# Patient Record
Sex: FEMALE | Race: WHITE | NOT HISPANIC OR LATINO | URBAN - METROPOLITAN AREA
[De-identification: names, ages, dates, MRNs, and addresses within clinical notes are randomized per-mention and may not be internally consistent; named-entity substitution may affect disease eponyms.]

---

## 2024-10-15 PROBLEM — Z78.9 OTHER SPECIFIED HEALTH STATUS: Chronic | Status: ACTIVE | Noted: 2020-07-20

## 2024-11-16 ENCOUNTER — EMERGENCY (EMERGENCY)
Facility: HOSPITAL | Age: 34
LOS: 1 days | Discharge: HOME | End: 2024-11-16
Admitting: EMERGENCY MEDICINE
Payer: MEDICAID

## 2024-11-16 VITALS
HEIGHT: 67 IN | SYSTOLIC BLOOD PRESSURE: 111 MMHG | TEMPERATURE: 98 F | DIASTOLIC BLOOD PRESSURE: 68 MMHG | WEIGHT: 151.02 LBS | OXYGEN SATURATION: 97 % | RESPIRATION RATE: 18 BRPM | HEART RATE: 88 BPM

## 2024-11-16 DIAGNOSIS — Z98.891 HISTORY OF UTERINE SCAR FROM PREVIOUS SURGERY: Chronic | ICD-10-CM

## 2024-11-16 PROCEDURE — 99284 EMERGENCY DEPT VISIT MOD MDM: CPT

## 2024-11-16 RX ORDER — ACETAMINOPHEN 500 MG
650 TABLET ORAL ONCE
Refills: 0 | Status: COMPLETED | OUTPATIENT
Start: 2024-11-16 | End: 2024-11-16

## 2024-11-16 RX ORDER — ONDANSETRON HYDROCHLORIDE 2 MG/ML
4 INJECTION, SOLUTION INTRAMUSCULAR; INTRAVENOUS ONCE
Refills: 0 | Status: COMPLETED | OUTPATIENT
Start: 2024-11-16 | End: 2024-11-16

## 2024-11-16 RX ORDER — SODIUM CHLORIDE 9 MG/ML
1000 INJECTION, SOLUTION INTRAMUSCULAR; INTRAVENOUS; SUBCUTANEOUS ONCE
Refills: 0 | Status: COMPLETED | OUTPATIENT
Start: 2024-11-16 | End: 2024-11-16

## 2024-11-16 NOTE — ED ADULT TRIAGE NOTE - PRO INTERPRETER NEED 2
This was a referral by Dr. Cline for [facility] education. I also sent scheduling info to the patient via [employer].    English

## 2024-11-16 NOTE — ED ADULT TRIAGE NOTE - CHIEF COMPLAINT QUOTE
Pt arrives to ED 10.5 weeks pregnant  c/o hyperemesis, constipation for 10 days and "milky discharge" while urinating with pain to right lower back and lower abdominal pain.  Pt unable to keep any fluid down..

## 2024-11-17 VITALS
SYSTOLIC BLOOD PRESSURE: 105 MMHG | OXYGEN SATURATION: 100 % | RESPIRATION RATE: 18 BRPM | DIASTOLIC BLOOD PRESSURE: 61 MMHG | HEART RATE: 72 BPM | TEMPERATURE: 98 F

## 2024-11-17 LAB
ALBUMIN SERPL ELPH-MCNC: 3.6 G/DL — SIGNIFICANT CHANGE UP (ref 3.3–5)
ALP SERPL-CCNC: 41 U/L — SIGNIFICANT CHANGE UP (ref 40–120)
ALT FLD-CCNC: 9 U/L — SIGNIFICANT CHANGE UP (ref 4–33)
ANION GAP SERPL CALC-SCNC: 13 MMOL/L — SIGNIFICANT CHANGE UP (ref 7–14)
APPEARANCE UR: ABNORMAL
AST SERPL-CCNC: 14 U/L — SIGNIFICANT CHANGE UP (ref 4–32)
BASOPHILS # BLD AUTO: 0.01 K/UL — SIGNIFICANT CHANGE UP (ref 0–0.2)
BASOPHILS NFR BLD AUTO: 0.1 % — SIGNIFICANT CHANGE UP (ref 0–2)
BILIRUB SERPL-MCNC: 0.2 MG/DL — SIGNIFICANT CHANGE UP (ref 0.2–1.2)
BILIRUB UR-MCNC: NEGATIVE — SIGNIFICANT CHANGE UP
BUN SERPL-MCNC: 8 MG/DL — SIGNIFICANT CHANGE UP (ref 7–23)
CALCIUM SERPL-MCNC: 8.6 MG/DL — SIGNIFICANT CHANGE UP (ref 8.4–10.5)
CHLORIDE SERPL-SCNC: 102 MMOL/L — SIGNIFICANT CHANGE UP (ref 98–107)
CO2 SERPL-SCNC: 20 MMOL/L — LOW (ref 22–31)
COLOR SPEC: SIGNIFICANT CHANGE UP
CREAT SERPL-MCNC: 0.51 MG/DL — SIGNIFICANT CHANGE UP (ref 0.5–1.3)
DIFF PNL FLD: NEGATIVE — SIGNIFICANT CHANGE UP
EGFR: 125 ML/MIN/1.73M2 — SIGNIFICANT CHANGE UP
EOSINOPHIL # BLD AUTO: 0.02 K/UL — SIGNIFICANT CHANGE UP (ref 0–0.5)
EOSINOPHIL NFR BLD AUTO: 0.2 % — SIGNIFICANT CHANGE UP (ref 0–6)
GLUCOSE SERPL-MCNC: 147 MG/DL — HIGH (ref 70–99)
GLUCOSE UR QL: NEGATIVE MG/DL — SIGNIFICANT CHANGE UP
HCT VFR BLD CALC: 36.6 % — SIGNIFICANT CHANGE UP (ref 34.5–45)
HGB BLD-MCNC: 12.5 G/DL — SIGNIFICANT CHANGE UP (ref 11.5–15.5)
IANC: 7.02 K/UL — SIGNIFICANT CHANGE UP (ref 1.8–7.4)
IMM GRANULOCYTES NFR BLD AUTO: 0.4 % — SIGNIFICANT CHANGE UP (ref 0–0.9)
KETONES UR-MCNC: 15 MG/DL
LEUKOCYTE ESTERASE UR-ACNC: ABNORMAL
LIDOCAIN IGE QN: 24 U/L — SIGNIFICANT CHANGE UP (ref 7–60)
LYMPHOCYTES # BLD AUTO: 1.13 K/UL — SIGNIFICANT CHANGE UP (ref 1–3.3)
LYMPHOCYTES # BLD AUTO: 13.3 % — SIGNIFICANT CHANGE UP (ref 13–44)
MCHC RBC-ENTMCNC: 29.8 PG — SIGNIFICANT CHANGE UP (ref 27–34)
MCHC RBC-ENTMCNC: 34.2 G/DL — SIGNIFICANT CHANGE UP (ref 32–36)
MCV RBC AUTO: 87.1 FL — SIGNIFICANT CHANGE UP (ref 80–100)
MONOCYTES # BLD AUTO: 0.3 K/UL — SIGNIFICANT CHANGE UP (ref 0–0.9)
MONOCYTES NFR BLD AUTO: 3.5 % — SIGNIFICANT CHANGE UP (ref 2–14)
NEUTROPHILS # BLD AUTO: 7.02 K/UL — SIGNIFICANT CHANGE UP (ref 1.8–7.4)
NEUTROPHILS NFR BLD AUTO: 82.5 % — HIGH (ref 43–77)
NITRITE UR-MCNC: NEGATIVE — SIGNIFICANT CHANGE UP
NRBC # BLD: 0 /100 WBCS — SIGNIFICANT CHANGE UP (ref 0–0)
NRBC # FLD: 0 K/UL — SIGNIFICANT CHANGE UP (ref 0–0)
PH UR: 5.5 — SIGNIFICANT CHANGE UP (ref 5–8)
PLATELET # BLD AUTO: 224 K/UL — SIGNIFICANT CHANGE UP (ref 150–400)
POTASSIUM SERPL-MCNC: 3.6 MMOL/L — SIGNIFICANT CHANGE UP (ref 3.5–5.3)
POTASSIUM SERPL-SCNC: 3.6 MMOL/L — SIGNIFICANT CHANGE UP (ref 3.5–5.3)
PROT SERPL-MCNC: 6.5 G/DL — SIGNIFICANT CHANGE UP (ref 6–8.3)
PROT UR-MCNC: 30 MG/DL
RBC # BLD: 4.2 M/UL — SIGNIFICANT CHANGE UP (ref 3.8–5.2)
RBC # FLD: 11.9 % — SIGNIFICANT CHANGE UP (ref 10.3–14.5)
SODIUM SERPL-SCNC: 135 MMOL/L — SIGNIFICANT CHANGE UP (ref 135–145)
SP GR SPEC: 1.04 — HIGH (ref 1–1.03)
UROBILINOGEN FLD QL: 1 MG/DL — SIGNIFICANT CHANGE UP (ref 0.2–1)
WBC # BLD: 8.51 K/UL — SIGNIFICANT CHANGE UP (ref 3.8–10.5)
WBC # FLD AUTO: 8.51 K/UL — SIGNIFICANT CHANGE UP (ref 3.8–10.5)

## 2024-11-17 PROCEDURE — 76815 OB US LIMITED FETUS(S): CPT | Mod: 26

## 2024-11-17 RX ORDER — METOCLOPRAMIDE HCL 10 MG
10 TABLET ORAL ONCE
Refills: 0 | Status: COMPLETED | OUTPATIENT
Start: 2024-11-17 | End: 2024-11-17

## 2024-11-17 RX ADMIN — SODIUM CHLORIDE 1000 MILLILITER(S): 9 INJECTION, SOLUTION INTRAMUSCULAR; INTRAVENOUS; SUBCUTANEOUS at 00:19

## 2024-11-17 RX ADMIN — Medication 10 MILLIGRAM(S): at 02:18

## 2024-11-17 RX ADMIN — ONDANSETRON HYDROCHLORIDE 4 MILLIGRAM(S): 2 INJECTION, SOLUTION INTRAMUSCULAR; INTRAVENOUS at 00:19

## 2024-11-17 NOTE — ED ADULT NURSE NOTE - OBJECTIVE STATEMENT
Pt received to intake 2, A&O x 4, ambulatory,  currenlty 10 weeks pregnant, PMHx hyperemesis, coming to ED with complaints of nausea, vomiting, urinary problems. Pt reports having persistent nausea and vomiting throughout pregnancy,  usually has home health RN for IV hydration, worsened over past few days, unable to tolerate PO intake, last BM 10 days ago, reports having milky white vaginal discharge and burning with urination. Pt denies HA, chest pain, SOB, diarrhea, fever, chills. 22G IV placed to L AC, labs drawn and sent, medicated as per EMAR, RR equal and unlabored, safety maintained, comfort provided, care plan ongoing.

## 2024-11-17 NOTE — ED PROVIDER NOTE - OBJECTIVE STATEMENT
34-year-old female  at 10 weeks gestation with past medical history hyperemesis presenting to the ER with nausea, vomiting and concern for UTI.  Patient reports that she has had persistent nausea and vomiting daily throughout this pregnancy.  States that her OB/GYN arrange for her to have at home IV fluids.  Patient states that she had been unable to have a bowel movement for the past 12 days, she initially went to [facility] ED today and while waiting to be seen states she had a large bowel movement, reports that she is having rectal discomfort at known hemorrhoids since having bowel movement.  She left [facility] before she was seen by a provider.  Patient also reports dysuria that began today and then white vaginal discharge.  Patient also reports mild lower abdominal discomfort and bilateral low back pain.  Patient denies fever/chills, chest pain, shortness of breath, headache, dizziness, diarrhea, hematuria, or any other concerns.

## 2024-11-17 NOTE — ED PROVIDER NOTE - PROGRESS NOTE DETAILS
SUZE Shen: Pt reassessed and reports persistent nausea, ordered for Reglan. Pt signed out to SUZE Chris pending reassessment SUZE Chris Addendum----This patient was signed out to me s/p receiving Reglan, for reassessment.  In the interim, pt objectively noted to be resting comfortably; pt has been clinically stable; no issues thus far.  Pt states she feels similar, but desires to go home; states she will f/u with her OB with whom she has appointment this Tuesday.  Pt stable for dc home.

## 2024-11-17 NOTE — ED PROVIDER NOTE - CLINICAL SUMMARY MEDICAL DECISION MAKING FREE TEXT BOX
34-year-old female  at 10 weeks gestation with past medical history hyperemesis presenting to the ER with nausea, vomiting and concern for UTI.  Patient reports that she has had persistent nausea and vomiting daily throughout this pregnancy.  States that her OB/GYN arrange for her to have at home IV fluids.  Patient states that she had been unable to have a bowel movement for the past 12 days, she initially went to [facility] ED today and while waiting to be seen states she had a large bowel movement, reports that she is having rectal discomfort at known hemorrhoids since having bowel movement.  She left [facility] before she was seen by a provider.  Patient also reports dysuria that began today and then white vaginal discharge.  Patient also reports mild lower abdominal discomfort and bilateral low back pain. No fever/chills. On exam pt is well appearing, afebrile, non tender abd, no CVA tenderness, __. Concern for uti, constipation, hemorrhoid pain, low suspicion acute intra-abdominal pathology. Plan: cbc/cmp, ua/ucx, OB US, IV fluids, zofran, tylenol. Will reassess. 34-year-old female  at 10 weeks gestation with past medical history hyperemesis presenting to the ER with nausea, vomiting and concern for UTI.  Patient reports that she has had persistent nausea and vomiting daily throughout this pregnancy.  States that her OB/GYN arrange for her to have at home IV fluids.  Patient states that she had been unable to have a bowel movement for the past 12 days, she initially went to [facility] ED today and while waiting to be seen states she had a large bowel movement, reports that she is having rectal discomfort at known hemorrhoids since having bowel movement.  She left [facility] before she was seen by a provider.  Patient also reports dysuria that began today and then white vaginal discharge.  Patient also reports mild lower abdominal discomfort and bilateral low back pain. No fever/chills. On exam pt is well appearing, afebrile, non tender abd, no CVA tenderness, pelvic exam with normal vaginal discharge, no CMT, no adnexal tenderness. Concern for uti, constipation, hyperemesis, hemorrhoid pain, low suspicion acute intra-abdominal pathology. Plan: cbc/cmp, ua/ucx, OB US, IV fluids, zofran, tylenol. Will reassess.

## 2024-11-17 NOTE — ED PROVIDER NOTE - NSFOLLOWUPINSTRUCTIONS_ED_ALL_ED_FT
Follow-up with your OB/GYN within 1 week  Drink plenty of fluids  Take Zofran 4 mg (1 tablet) every 8 hours as needed for nausea  You can take an over-the-counter stool softener such as senna  You can also take MiraLAX as needed, 1 heaping teaspoon mixed in a large glass of water once a day  Recommend a fiber supplement such as Metamucil  Return to the ER with any worsening or concerning symptoms abdominal pain, vomiting, fever/chills, weakness, discomfort with urination or any other concerns. Follow-up with your OB/GYN this Tuesday as scheduled.  **Bring your discharge papers / test results with you to your follow up appointment.    Please also notify your primary medical doctor of your ER visit and follow up within 2-3 days of ER discharge.  **Bring your discharge papers / test results with you to your follow up appointment.    If you ever need assistance finding a doctor to follow up with, you may call the [facility] Patient Access Services helpline at [phone] to find names/contact #s for a provider/specialist to follow up with.      Rest / no strenuous activity.  Stay well hydrated.    A copy of your test results is being provided to you.  All results including incidental findings were reviewed at discharge.  Should you have any questions that arise after you are discharged, please feel free to contact the ER ([phone]) to have your questions answered.      MEDICATIONS:    See attached medication sheet for details of prescription sent to your pharmacy.  Call your OB/GYN doctor on Monday 11/18 and confirm with them that they are agreeable to your taking this medication.  These medication prescription details were reviewed with you; please make sure to take all medications AS PRESCRIBED.  Take medication only as needed.    Continue your Zofran as previously prescribed by your doctor.    You can take Miralax once daily as needed for constipation relief.  You can take Fleet Enema as needed for constipation relief.  You can take Colace (docusate) one tab orally two to three times per day  OR  Senna 2 tabs orally once a day at bedtime as needed for constipation.      ***Return to the Emergency Department IMMEDIATELY if you experience any new / worsening symptoms or have any problems / concerns.***

## 2024-11-17 NOTE — ED PROVIDER NOTE - PATIENT PORTAL LINK FT
You can access the FollowMyHealth Patient Portal offered by [facility] by registering at the following website: [email or web address] By joining [email or web address] FollowMyHealth portal, you will also be able to view your health information using other applications (apps) compatible with our system.

## 2024-11-17 NOTE — ED PROVIDER NOTE - PHYSICAL EXAMINATION
no CVA tenderness no CVA tenderness  : normal vaginal discharge, no CMT, no adnexal tenderness, pt deferred speculum exam  Chaperone ED Tech Niyah no CVA tenderness  : normal vaginal discharge, no CMT, no adnexal tenderness, pt deferred speculum exam  Chaperone ED Tech Casusan  Rectal Exam: multiple non thrombosed external hemorrhoids

## 2024-11-18 LAB
CULTURE RESULTS: SIGNIFICANT CHANGE UP
SPECIMEN SOURCE: SIGNIFICANT CHANGE UP

## 2024-11-19 ENCOUNTER — APPOINTMENT (OUTPATIENT)
Dept: OBGYN | Facility: CLINIC | Age: 34
End: 2024-11-19

## 2024-11-19 PROCEDURE — 99213 OFFICE O/P EST LOW 20 MIN: CPT

## 2024-11-21 ENCOUNTER — APPOINTMENT (OUTPATIENT)
Dept: OBGYN | Facility: CLINIC | Age: 34
End: 2024-11-21